# Patient Record
Sex: FEMALE | Race: WHITE | NOT HISPANIC OR LATINO | URBAN - METROPOLITAN AREA
[De-identification: names, ages, dates, MRNs, and addresses within clinical notes are randomized per-mention and may not be internally consistent; named-entity substitution may affect disease eponyms.]

---

## 2017-02-17 ENCOUNTER — GENERIC CONVERSION - ENCOUNTER (OUTPATIENT)
Dept: OTHER | Facility: OTHER | Age: 32
End: 2017-02-17

## 2017-05-02 ENCOUNTER — ALLSCRIPTS OFFICE VISIT (OUTPATIENT)
Dept: OTHER | Facility: OTHER | Age: 32
End: 2017-05-02

## 2017-07-30 ENCOUNTER — OFFICE VISIT (OUTPATIENT)
Dept: URGENT CARE | Facility: MEDICAL CENTER | Age: 32
End: 2017-07-30
Payer: COMMERCIAL

## 2017-07-30 PROCEDURE — 99202 OFFICE O/P NEW SF 15 MIN: CPT

## 2018-01-11 NOTE — MISCELLANEOUS
Message   Recorded as Task   Date: 02/17/2017 08:17 AM, Created By: Mayra Berg   Task Name: Med Renewal Request   Assigned To: Jh Salinas   Regarding Patient: Felipe Klein, Status: Active   CommentKathy Conroy - 17 Feb 2017 8:17 AM     TASK CREATED    pt requested refill, sent to pharm,yrly in april w/ vg        Active Problems    1  Acne vulgaris (706 1) (L70 0)   2  Encounter for gynecological examination without abnormal finding (V72 31) (Z01 419)   3  Encounter for routine gynecological examination (V72 31) (Z01 419)   4  Irregular menstrual cycle (626 4) (N92 6)   5  Oral contraceptive prescribed (V25 01) (Z30 011)   6  Oral contraceptives use    Current Meds   1  Gianvi 3-0 02 MG Oral Tablet; TAKE 1 TABLET DAILY; Therapy: 19Apr2016 to (Evaluate:21Mar2017)  Requested for: 19Apr2016; Last   Rx:19Apr2016 Ordered    Allergies    1   No Known Drug Allergies    Plan  Acne vulgaris, Oral contraceptive prescribed    · From  Gianvi 3-0 02 MG Oral Tablet TAKE 1 TABLET DAILY To Gianvi 3-0 02  MG Oral Tablet TAKE ONE TABLET EVERY DAY    Signatures   Electronically signed by : Roshan Fish, ; Feb 17 2017  8:17AM EST                       (Author)

## 2018-01-12 VITALS
WEIGHT: 142 LBS | SYSTOLIC BLOOD PRESSURE: 118 MMHG | BODY MASS INDEX: 24.24 KG/M2 | DIASTOLIC BLOOD PRESSURE: 70 MMHG | HEIGHT: 64 IN

## 2018-05-04 PROBLEM — J30.2 SEASONAL ALLERGIES: Status: ACTIVE | Noted: 2017-07-30

## 2018-05-04 PROBLEM — J04.0 ACUTE LARYNGITIS: Status: ACTIVE | Noted: 2017-07-30

## 2018-06-01 ENCOUNTER — ANNUAL EXAM (OUTPATIENT)
Dept: OBGYN CLINIC | Facility: MEDICAL CENTER | Age: 33
End: 2018-06-01
Payer: COMMERCIAL

## 2018-06-01 VITALS
DIASTOLIC BLOOD PRESSURE: 72 MMHG | SYSTOLIC BLOOD PRESSURE: 120 MMHG | WEIGHT: 152 LBS | BODY MASS INDEX: 25.95 KG/M2 | HEIGHT: 64 IN

## 2018-06-01 DIAGNOSIS — Z01.419 ENCOUNTER FOR GYNECOLOGICAL EXAMINATION WITHOUT ABNORMAL FINDING: Primary | ICD-10-CM

## 2018-06-01 DIAGNOSIS — Z30.41 SURVEILLANCE OF PREVIOUSLY PRESCRIBED CONTRACEPTIVE PILL: ICD-10-CM

## 2018-06-01 PROCEDURE — 99395 PREV VISIT EST AGE 18-39: CPT | Performed by: NURSE PRACTITIONER

## 2018-06-01 RX ORDER — ESCITALOPRAM OXALATE 10 MG/1
10 TABLET ORAL DAILY
Refills: 5 | COMMUNITY
Start: 2018-05-04

## 2018-06-01 RX ORDER — DROSPIRENONE AND ETHINYL ESTRADIOL TABLETS 0.02-3(28)
1 KIT ORAL DAILY
Refills: 2 | COMMUNITY
Start: 2018-05-02

## 2018-06-01 NOTE — PROGRESS NOTES
[facility]  GYNECOLOGIC EXAM  Hari Mckeon 33 y o  SUBJECTIVE    HPI: Hari Mckeon is a 33 y o   female presenting today for annual GYN exam  Her last gynecologic exam was in 2017 at our practice  Patient's last menstrual period was 2018  Periods are regular on OCPs but she may stop them because she has low libido affecting her marriage and her acne is not improving  Her  had a vasectomy  Sexually active with 1 Male partner, monogamous  Declined STI testing today  Denies sexual concerns  Has no breast, bowel, bladder, or vaginal concerns  Works in a   Gynecologic History   Last pap smear: Date: 2016 and Result: NILM  Contraceptive method: vasectomy  Dyspareunia: denies      Obstetric History    Desires conception in the next year: No    Health Maintenance  Self-breast exams: sometimes  Tobacco cessation: N/A      The following portions of the patient's history were reviewed and updated as appropriate: allergies, current medications, past family history, past medical history, past social history, past surgical history and problem list     At todays visit I discussed the importance of self-breast exams and awareness  We discussed the importance of exercise and healthy diet  I reviewed ASCCP guidelines for cervical cancer screening  Safe sexual practices were strongly encouraged to protect against sexually transmitted infections  We reviewed her reproductive life plan  All questions were answered to her apparent satisfaction       ROS  General ROS: negative for chills or fever  Breast ROS: negative for breast lumps  Respiratory ROS: no cough, shortness of breath, or wheezing  Cardiovascular ROS: no chest pain or dyspnea on exertion  Gastrointestinal ROS: no abdominal pain, change in bowel habits, or black or bloody stools  Genito-Urinary ROS: no dysuria, trouble voiding, or hematuria  Neurological ROS: negative    OBJECTIVE  Vitals:    06/01/18 1304   BP: 120/72   BP Location: Left arm   Patient Position: Sitting   Weight: 68 9 kg (152 lb)   Height: 5' 3 5" (1 613 m)       Physical Exam   Constitutional: She is oriented to person, place, and time  Vital signs are normal  She appears well-developed and well-nourished  Genitourinary: Vagina normal and uterus normal  Pelvic exam was performed with patient supine  There is no rash, tenderness or lesion on the right labia  There is no rash, tenderness or lesion on the left labia  No erythema in the vagina  No vaginal discharge found  Right adnexum does not display mass and does not display tenderness  Left adnexum does not display mass and does not display tenderness  Cervix does not exhibit motion tenderness  Uterus is not tender  HENT:   Head: Normocephalic and atraumatic  Pulmonary/Chest: Effort normal  Right breast exhibits no inverted nipple, no mass, no nipple discharge, no skin change and no tenderness  Left breast exhibits no inverted nipple, no mass, no nipple discharge, no skin change and no tenderness  Breasts are symmetrical    Abdominal: Soft  Normal appearance  Musculoskeletal: Normal range of motion  Lymphadenopathy:     She has no axillary adenopathy  Neurological: She is alert and oriented to person, place, and time  Skin: Skin is warm, dry and intact  Facial acne noted  Psychiatric: She has a normal mood and affect  Her behavior is normal    Vitals reviewed  ASSESSMENT  Normal exam  Low libido on OCPs      PLAN  1  Return annually for routine GYN exams  2  Stop OCPs if desired, call if still concerns with low libido      All questions were answered & [facility] expressed understanding        Terrance Stallings

## 2020-07-06 ENCOUNTER — OFFICE VISIT (OUTPATIENT)
Dept: URGENT CARE | Facility: MEDICAL CENTER | Age: 35
End: 2020-07-06
Payer: COMMERCIAL

## 2020-07-06 VITALS
DIASTOLIC BLOOD PRESSURE: 83 MMHG | SYSTOLIC BLOOD PRESSURE: 133 MMHG | RESPIRATION RATE: 18 BRPM | BODY MASS INDEX: 27.86 KG/M2 | HEART RATE: 79 BPM | HEIGHT: 64 IN | TEMPERATURE: 98 F | OXYGEN SATURATION: 98 % | WEIGHT: 163.2 LBS

## 2020-07-06 DIAGNOSIS — S61.011A LACERATION OF RIGHT THUMB WITHOUT FOREIGN BODY WITHOUT DAMAGE TO NAIL, INITIAL ENCOUNTER: Primary | ICD-10-CM

## 2020-07-06 PROCEDURE — G0382 LEV 3 HOSP TYPE B ED VISIT: HCPCS | Performed by: PHYSICIAN ASSISTANT

## 2020-07-06 RX ORDER — CEPHALEXIN 500 MG/1
500 CAPSULE ORAL EVERY 6 HOURS SCHEDULED
Qty: 28 CAPSULE | Refills: 0 | Status: SHIPPED | OUTPATIENT
Start: 2020-07-06 | End: 2020-07-13

## 2020-07-06 NOTE — PATIENT INSTRUCTIONS
Laceration right thumb  Keflex as directed  Return tomorrow for surgifoam removal  Follow up with PCP in 3-5 days  Proceed to  ER if symptoms worsen  Laceration   WHAT YOU NEED TO KNOW:   A laceration is an injury to the skin and the soft tissue underneath it  Lacerations happen when you are cut or hit by something  They can happen anywhere on the body  DISCHARGE INSTRUCTIONS:   Return to the emergency department if:   · You have heavy bleeding or bleeding that does not stop after 10 minutes of holding firm, direct pressure over the wound  · Your wound opens up  Contact your healthcare provider if:   · You have a fever or chills  · Your laceration is red, warm, or swollen  · You have red streaks on your skin coming from your wound  · You have white or yellow drainage from the wound that smells bad  · You have pain that gets worse, even after treatment  · You have questions or concerns about your condition or care  Medicines:   · Prescription pain medicine  may be given  Ask how to take this medicine safely  · Antibiotics  help treat or prevent a bacterial infection  · Take your medicine as directed  Contact your healthcare provider if you think your medicine is not helping or if you have side effects  Tell him or her if you are allergic to any medicine  Keep a list of the medicines, vitamins, and herbs you take  Include the amounts, and when and why you take them  Bring the list or the pill bottles to follow-up visits  Carry your medicine list with you in case of an emergency  Care for your wound as directed:   · Do not get your wound wet  until your healthcare provider says it is okay  Do not soak your wound in water  Do not go swimming until your healthcare provider says it is okay  Carefully wash the wound with soap and water  Gently pat the area dry or allow it to air dry  · Change your bandages  when they get wet, dirty, or after washing   Apply new, clean bandages as directed  Do not apply elastic bandages or tape too tight  Do not put powders or lotions over your incision  · Apply antibiotic ointment as directed  Your healthcare provider may give you antibiotic ointment to put over your wound if you have stitches  If you have strips of tape over your incision, let them dry up and fall off on their own  If they do not fall off within 14 days, gently remove them  If you have glue over your wound, do not remove or pick at it  If your glue comes off, do not replace it with glue that you have at home  · Check your wound every day for signs of infection such as swelling, redness, or pus  Self-care:   · Apply ice  on your wound for 15 to 20 minutes every hour or as directed  Use an ice pack, or put crushed ice in a plastic bag  Cover it with a towel  Ice helps prevent tissue damage and decreases swelling and pain  · Use a splint as directed  A splint will decrease movement and stress on your wound  It may help it heal faster  A splint may be used for lacerations over joints or areas of your body that bend  Ask your healthcare provider how to apply and remove a splint  · Decrease scarring of your wound  by applying ointments as directed  Do not apply ointments until your healthcare provider says it is okay  You may need to wait until your wound is healed  Ask which ointment to buy and how often to use it  After your wound is healed, use sunscreen over the area when you are out in the sun  You should do this for at least 6 months to 1 year after your injury  Follow up with your healthcare provider as directed: You may need to follow up in 24 to 48 hours to have your wound checked for infection  You will need to return in 3 to 14 days if you have stitches or staples so they can be removed  Care for your wound as directed to prevent infection and help it heal  Write down your questions so you remember to ask them during your visits    © 2017 [employer]  Information is for End User's use only and may not be sold, redistributed or otherwise used for commercial purposes  All illustrations and images included in CareNotes® are the copyrighted property of [employer]  or [employer]  The above information is an  only  It is not intended as medical advice for individual conditions or treatments  Talk to your doctor, nurse or pharmacist before following any medical regimen to see if it is safe and effective for you

## 2020-07-06 NOTE — PROGRESS NOTES
[facility]        NAME: Windy Shaffer is a 36 y o  female  : 1985    MRN: [id number]  DATE: 2020  TIME: 5:01 PM    Assessment and Plan   Laceration of right thumb without foreign body without damage to nail, initial encounter [S61 011A]  1  Laceration of right thumb without foreign body without damage to nail, initial encounter           Patient Instructions     Laceration right thumb  Keflex as directed  Return tomorrow for surgifoam removal  Follow up with PCP in 3-5 days  Proceed to  ER if symptoms worsen  Chief Complaint     Chief Complaint   Patient presents with    Finger Laceration     Pt was slicing cucumbers with a mandolen and lacerated her right thumb  Tdap-9/3/15  History of Present Illness       35 y/o female presents c/o slicing a piece of skin from her right thumb last night and it has not stopped bleeding  Tetanus is up to date      Review of Systems   Review of Systems   Constitutional: Negative  HENT: Negative  Eyes: Negative  Respiratory: Negative  Negative for cough, chest tightness, shortness of breath, wheezing and stridor  Cardiovascular: Negative  Negative for chest pain, palpitations and leg swelling  Skin: Positive for wound           Current Medications       Current Outpatient Medications:     Cetirizine HCl (ZYRTEC ALLERGY) 10 MG CAPS, Take by mouth, Disp: , Rfl:     escitalopram (LEXAPRO) 10 mg tablet, Take 10 mg by mouth daily, Disp: , Rfl: 5    LORYNA 3-0 02 MG per tablet, 1 tablet daily, Disp: , Rfl: 2    Current Allergies     Allergies as of 2020    (No Known Allergies)            The following portions of the patient's history were reviewed and updated as appropriate: allergies, current medications, past family history, past medical history, past social history, past surgical history and problem list      Past Medical History:   Diagnosis Date    Acne     last assessed 13    Cervical incompetence     Decreased libido     Normal delivery     2007 son, 2009 daughter       Past Surgical History:   Procedure Laterality Date    CERVICAL CERCLAGE      during pregnancy; resolved 2009       Family History   Problem Relation Age of Onset    Other Mother         gluten free diet    Other Father         adopted    Mental illness Family     Stroke Family     Lymphoma Family          Medications have been verified  Objective   /83   Pulse 79   Temp 98 °F (36 7 °C) (Temporal)   Resp 18   Ht 5' 4" (1 626 m)   Wt 74 kg (163 lb 3 2 oz)   LMP 06/29/2020 (Approximate)   SpO2 98%   BMI 28 01 kg/m²        Physical Exam     Physical Exam   Constitutional: She appears well-developed and well-nourished  HENT:   Head: Normocephalic and atraumatic  Neck: Normal range of motion  Neck supple  Cardiovascular: Normal rate, regular rhythm, normal heart sounds and intact distal pulses  Pulmonary/Chest: Effort normal and breath sounds normal  No stridor  No respiratory distress  She has no wheezes  She has no rales  She exhibits no tenderness  Musculoskeletal:        Hands:  Lymphadenopathy:     She has no cervical adenopathy         Area cleaned an prepped in sterile fashion, irrigated and surgifoam applied

## 2020-07-07 ENCOUNTER — OFFICE VISIT (OUTPATIENT)
Dept: URGENT CARE | Facility: MEDICAL CENTER | Age: 35
End: 2020-07-07

## 2020-07-07 VITALS
SYSTOLIC BLOOD PRESSURE: 111 MMHG | BODY MASS INDEX: 27.83 KG/M2 | TEMPERATURE: 97.5 F | OXYGEN SATURATION: 98 % | HEART RATE: 75 BPM | RESPIRATION RATE: 18 BRPM | DIASTOLIC BLOOD PRESSURE: 70 MMHG | HEIGHT: 64 IN | WEIGHT: 163 LBS

## 2020-07-07 DIAGNOSIS — Z51.89 ENCOUNTER FOR WOUND RE-CHECK: Primary | ICD-10-CM

## 2020-07-07 PROCEDURE — 26080 EXPLORE/TREAT FINGER JOINT: CPT | Performed by: PHYSICIAN ASSISTANT

## 2020-07-07 PROCEDURE — 99213 OFFICE O/P EST LOW 20 MIN: CPT | Performed by: PHYSICIAN ASSISTANT

## 2020-07-07 NOTE — PROGRESS NOTES
[facility]        NAME: Kym Vyas is a 36 y o  female  : 1985    MRN: [id number]  DATE: 2020  TIME: 5:39 PM    Assessment and Plan   Encounter for wound re-check [Z51 89]  1  Encounter for wound re-check       Surgical foam removed from wound without complication  No bleeding  Covered with Band-Aid  Advised to keep clean and dry and finish a course of antibiotics  Patient Instructions   Tylenol or Motrin for pain  Keep area clean and dry  Finish out antibiotics  Follow up with PCP in 3-5 days  Proceed to  ER if symptoms worsen  Chief Complaint     Chief Complaint   Patient presents with    Finger Injury     Pt here for a follow up re: right thumb  Pt here to have the surgifoam removed  History of Present Illness       Patient is a 78-year-old female who presents today for surgical foam removal from right thumb  Patient states she was seen here yesterday after laceration to the right thumb with mandolin slicer  Reports no drainage or bleeding  Was put on a course of Keflex  Review of Systems   Review of Systems   Constitutional: Negative for fever  Respiratory: Negative for shortness of breath  Cardiovascular: Negative for chest pain  Musculoskeletal: Positive for myalgias  Skin: Positive for wound  Negative for color change           Current Medications       Current Outpatient Medications:     cephalexin (KEFLEX) 500 mg capsule, Take 1 capsule (500 mg total) by mouth every 6 (six) hours for 7 days, Disp: 28 capsule, Rfl: 0    Cetirizine HCl (ZYRTEC ALLERGY) 10 MG CAPS, Take by mouth, Disp: , Rfl:     escitalopram (LEXAPRO) 10 mg tablet, Take 10 mg by mouth daily, Disp: , Rfl: 5    LORYNA 3-0 02 MG per tablet, 1 tablet daily, Disp: , Rfl: 2    Current Allergies     Allergies as of 2020    (No Known Allergies)            The following portions of the patient's history were reviewed and updated as appropriate: allergies, current medications, past family history, past medical history, past social history, past surgical history and problem list      Past Medical History:   Diagnosis Date    Acne     last assessed 08/16/13    Cervical incompetence     Decreased libido     Normal delivery     2007 son, 2009 daughter       Past Surgical History:   Procedure Laterality Date    CERVICAL CERCLAGE      during pregnancy; resolved 2009       Family History   Problem Relation Age of Onset    Other Mother         gluten free diet    Other Father         adopted    Mental illness Family     Stroke Family     Lymphoma Family          Medications have been verified  Objective   /70   Pulse 75   Temp 97 5 °F (36 4 °C) (Temporal)   Resp 18   Ht 5' 4" (1 626 m)   Wt 73 9 kg (163 lb)   LMP 06/29/2020 (Approximate)   SpO2 98%   BMI 27 98 kg/m²        Physical Exam     Physical Exam   Constitutional: She appears well-developed and well-nourished  No distress  Cardiovascular: Normal rate and regular rhythm  Pulmonary/Chest: Effort normal and breath sounds normal    Musculoskeletal: Normal range of motion  She exhibits tenderness  She exhibits no edema or deformity  Skin: Skin is warm and dry  Laceration noted  Foreign body removal  Date/Time: 7/7/2020 5:39 PM  Performed by: Xavier Aguilar PA-C  Authorized by: Xavier Aguilar PA-C   Universal Protocol:Consent: Verbal consent obtained  Consent given by: patient  Patient identity confirmed: verbally with patient    Body area: skin  General location: upper extremity  Location details: right thumb  Localization method: visualized  Removal mechanism: forceps  Dressing: dressing applied  Tendon involvement: none  Depth: subcutaneous  Complexity: simple  1 objects recovered    Objects recovered: surgical foam pad  Post-procedure assessment: foreign body removed  Patient tolerance: Patient tolerated the procedure well with no immediate complications